# Patient Record
Sex: FEMALE | Race: WHITE | Employment: OTHER | URBAN - METROPOLITAN AREA
[De-identification: names, ages, dates, MRNs, and addresses within clinical notes are randomized per-mention and may not be internally consistent; named-entity substitution may affect disease eponyms.]

---

## 2017-08-16 PROCEDURE — 81001 URINALYSIS AUTO W/SCOPE: CPT | Performed by: INTERNAL MEDICINE

## 2017-08-26 PROCEDURE — 87086 URINE CULTURE/COLONY COUNT: CPT | Performed by: INTERNAL MEDICINE

## 2019-09-17 PROBLEM — Z53.20 COLONOSCOPY REFUSED: Status: ACTIVE | Noted: 2019-09-17

## 2019-09-17 PROBLEM — Z28.21 REFUSED INFLUENZA VACCINE: Status: ACTIVE | Noted: 2019-09-17

## 2019-09-17 PROBLEM — Z28.21 REFUSED PNEUMOCOCCAL VACCINATION: Status: ACTIVE | Noted: 2019-09-17

## 2025-04-05 ENCOUNTER — HOSPITAL ENCOUNTER (EMERGENCY)
Age: 80
Discharge: HOME | End: 2025-04-05
Attending: EMERGENCY MEDICINE
Payer: MEDICARE

## 2025-04-05 ENCOUNTER — APPOINTMENT (OUTPATIENT)
Dept: GENERAL RADIOLOGY | Age: 80
End: 2025-04-05
Attending: EMERGENCY MEDICINE
Payer: MEDICARE

## 2025-04-05 VITALS
SYSTOLIC BLOOD PRESSURE: 150 MMHG | WEIGHT: 140 LBS | RESPIRATION RATE: 18 BRPM | BODY MASS INDEX: 23.9 KG/M2 | OXYGEN SATURATION: 96 % | DIASTOLIC BLOOD PRESSURE: 70 MMHG | HEIGHT: 64 IN | TEMPERATURE: 98 F | HEART RATE: 92 BPM
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
## 2025-04-05 DIAGNOSIS — S20.211A RIB CONTUSION, RIGHT, INITIAL ENCOUNTER: Primary | ICD-10-CM

## 2025-04-05 DIAGNOSIS — S22.31XA CLOSED FRACTURE OF ONE RIB OF RIGHT SIDE, INITIAL ENCOUNTER: ICD-10-CM

## 2025-04-05 PROCEDURE — 99284 EMERGENCY DEPT VISIT MOD MDM: CPT

## 2025-04-05 PROCEDURE — 71101 X-RAY EXAM UNILAT RIBS/CHEST: CPT | Performed by: EMERGENCY MEDICINE

## 2025-04-05 PROCEDURE — 99283 EMERGENCY DEPT VISIT LOW MDM: CPT

## 2025-04-05 RX ORDER — LIDOCAINE 50 MG/G
1 PATCH TOPICAL EVERY 24 HOURS
Qty: 30 PATCH | Refills: 0 | Status: SHIPPED | OUTPATIENT
Start: 2025-04-05

## 2025-04-05 RX ORDER — IBUPROFEN 400 MG/1
400 TABLET, FILM COATED ORAL EVERY 8 HOURS PRN
Qty: 30 TABLET | Refills: 0 | Status: SHIPPED | OUTPATIENT
Start: 2025-04-05

## 2025-04-05 RX ORDER — HYDROCODONE BITARTRATE AND ACETAMINOPHEN 5; 325 MG/1; MG/1
1 TABLET ORAL EVERY 6 HOURS PRN
Qty: 20 TABLET | Refills: 0 | Status: SHIPPED | OUTPATIENT
Start: 2025-04-05

## 2025-04-05 RX ORDER — HYDROCODONE BITARTRATE AND ACETAMINOPHEN 5; 325 MG/1; MG/1
1 TABLET ORAL ONCE
Status: COMPLETED | OUTPATIENT
Start: 2025-04-05 | End: 2025-04-05

## 2025-04-06 NOTE — ED PROVIDER NOTES
Patient Seen in: [facility] In [place]      History     Chief Complaint   Patient presents with    Trauma 1 & 2     Stated Complaint: fall around 6pm, right rib pain    Subjective:   Patient is 79-year-old female who had a mechanical fall today around 530.  Patient tripped and landed onto a corner of a coffee table.  Patient has bruising over her right posterior ribs.  Patient has tenderness to palpation.  She has not any blood thinners she did not hit her head she did not lose consciousness.  Patient did not take any medications prior to arrival.  She does not use tobacco.  Patient's family is present with her they plan to stay with the patient tonight    The history is provided by the patient and a relative.             Objective:     Past Medical History:    Essential hypertension    Kidney stone              Past Surgical History:   Procedure Laterality Date    Appendectomy      Cholecystectomy      D & c      Hysterectomy      Other surgical history      wrist surgery    Tonsillectomy                  Social History     Socioeconomic History    Marital status:    Tobacco Use    Smoking status: Never    Smokeless tobacco: Never   Vaping Use    Vaping status: Never Used   Substance and Sexual Activity    Alcohol use: No    Drug use: No    Sexual activity: Never                  Physical Exam     ED Triage Vitals [04/05/25 2225]   /83   Pulse 96   Resp 18   Temp 98.2 °F (36.8 °C)   Temp src Temporal   SpO2 94 %   O2 Device None (Room air)       Current Vitals:   Vital Signs  BP: 157/83  Pulse: 96  Resp: 18  Temp: 98.2 °F (36.8 °C)  Temp src: Temporal    Oxygen Therapy  SpO2: 94 %  O2 Device: None (Room air)        Physical Exam  Vitals and nursing note reviewed.   Constitutional:       General: She is not in acute distress.     Appearance: Normal appearance. She is normal weight. She is not toxic-appearing.   HENT:      Head: Normocephalic and atraumatic.      Mouth/Throat:       Mouth: Mucous membranes are moist.   Eyes:      Extraocular Movements: Extraocular movements intact.      Pupils: Pupils are equal, round, and reactive to light.   Cardiovascular:      Rate and Rhythm: Normal rate and regular rhythm.      Pulses: Normal pulses.      Heart sounds: Normal heart sounds.   Pulmonary:      Effort: Pulmonary effort is normal.      Breath sounds: Normal breath sounds.   Abdominal:      General: Bowel sounds are normal. There is no distension.      Palpations: Abdomen is soft.      Tenderness: There is no abdominal tenderness.   Musculoskeletal:         General: Normal range of motion.      Comments: Tenderness to palpation over the right posterior lower ribs.  Bruising noted no step-off   Skin:     General: Skin is warm.      Capillary Refill: Capillary refill takes less than 2 seconds.      Findings: Bruising present.   Neurological:      General: No focal deficit present.      Mental Status: She is alert and oriented to person, place, and time.      Cranial Nerves: No cranial nerve deficit.      Sensory: No sensory deficit.   Psychiatric:         Mood and Affect: Mood normal.         Behavior: Behavior normal.             ED Course   Labs Reviewed - No data to display       XR RIBS WITH CHEST (3 VIEWS), RIGHT  (CPT=71101)    Result Date: 4/5/2025  PROCEDURE:  XR RIBS WITH CHEST (3 VIEWS), RIGHT  (CPT=71101)  TECHNIQUE:  PA Chest and three views of the ribs were obtained  COMPARISON:  None.  INDICATIONS:  fall around 6pm, right rib pain  PATIENT STATED HISTORY: (As transcribed by Technologist)  Pain to right side posterior inferior rib pain status post fall and striking table today.    FINDINGS:  Mild nodularity within the right hilar region, which could reflect lymphadenopathy.  Consider further evaluation with CT chest as clinically indicated.  Otherwise no focal consolidation.  No pneumothorax.  No significant pleural effusion.  Cardiac silhouette is within normal limits.  There is a  displaced right 11th rib fracture posteriorly.            CONCLUSION:  1. Right posterior eleventh rib fracture. 2. Mild right hilar nodularity, which could reflect lymphadenopathy.  Consider further evaluation with CT as clinically indicated.   LOCATION:  [facility]     Dictated by (CST): Raul Iverson MD on 4/05/2025 at 11:23 PM     Finalized by (CST): Raul Iverson MD on 4/05/2025 at 11:26 PM             MDM      Social -negative tobacco, negative etoh, negative drugs  Family History-noncontributory  Past Medical History-hypertension, kidney stone    Differential diagnosis before testing included rib contusion, fracture, hemothorax, pneumothorax    Co-morbidities that add to the complexity of management include: None    Testing ordered during this visit included x-ray of the ribs    Radiographic images  I personally reviewed the radiographs and my individual interpretation shows 11 posterior rib fracture  I also reviewed the official reports that showed XR RIBS WITH CHEST (3 VIEWS), RIGHT  (CPT=71101)    Result Date: 4/5/2025  PROCEDURE:  XR RIBS WITH CHEST (3 VIEWS), RIGHT  (CPT=71101)  TECHNIQUE:  PA Chest and three views of the ribs were obtained  COMPARISON:  None.  INDICATIONS:  fall around 6pm, right rib pain  PATIENT STATED HISTORY: (As transcribed by Technologist)  Pain to right side posterior inferior rib pain status post fall and striking table today.    FINDINGS:  Mild nodularity within the right hilar region, which could reflect lymphadenopathy.  Consider further evaluation with CT chest as clinically indicated.  Otherwise no focal consolidation.  No pneumothorax.  No significant pleural effusion.  Cardiac silhouette is within normal limits.  There is a displaced right 11th rib fracture posteriorly.            CONCLUSION:  1. Right posterior eleventh rib fracture. 2. Mild right hilar nodularity, which could reflect lymphadenopathy.  Consider further evaluation with CT as clinically indicated.    LOCATION:  [facility]     Dictated by (CST): Raul Iverson MD on 4/05/2025 at 11:23 PM     Finalized by (CST): Raul Iverson MD on 4/05/2025 at 11:26 PM          External chart review showed review of Care Everywhere in epic system shows no related comorbidities to current presentation    History obtained by an independent source included from patient, family    Discussion of management with patient, family    Social determinants of health that affect care include not applicable      Medications Provided: Norco Lidoderm ice incentive spirometer Motrin    Course of Events during Emergency Room Visit include 79-year-old female presents emergency room for evaluation of rib contusion will get x-ray of the ribs give pain medication incentive spirometer Lidoderm patch ice.  Patient to follow-up with primary care physician          Disposition:          Discharge  I have discussed with the patient the results of test, differential diagnosis, treatment plan, warning signs and symptoms which should prompt immediate return.  They expressed understanding of these instructions and agrees to the following plan provided.  They were given written discharge instructions and agrees to return for any concerns and voiced understanding and all questions were answered.           Medical Decision Making      Disposition and Plan     Clinical Impression:  1. Rib contusion, right, initial encounter    2. Fall, initial encounter    3. Closed fracture of one rib of right side, initial encounter         Disposition:  Discharge  4/5/2025 11:35 pm    Follow-up:  Florencio Silverman MD  [id number] [facility] [address]  [phone]    Schedule an appointment as soon as possible for a visit            Medications Prescribed:  Current Discharge Medication List        START taking these medications    Details   ibuprofen 400 MG Oral Tab Take 1 tablet (400 mg total) by mouth every 8 (eight) hours as needed for Pain or Fever.  Qty: 30 tablet,  Refills: 0      HYDROcodone-acetaminophen 5-325 MG Oral Tab Take 1 tablet by mouth every 6 (six) hours as needed for Pain. Do not drive or operate machinery within 8 hours of taking this medication  Qty: 20 tablet, Refills: 0    Associated Diagnoses: Rib contusion, right, initial encounter      lidocaine 5 % External Patch Place 1 patch onto the skin daily.  Qty: 30 patch, Refills: 0    Associated Diagnoses: Rib contusion, right, initial encounter                 Supplementary Documentation:

## 2025-04-06 NOTE — ED INITIAL ASSESSMENT (HPI)
Fell around 6pm and landed on the cornor of a table. Right rib pain. No blood thinners. No LOC. Was mechanical fall.   Name band;